# Patient Record
Sex: FEMALE | Race: WHITE | URBAN - NONMETROPOLITAN AREA
[De-identification: names, ages, dates, MRNs, and addresses within clinical notes are randomized per-mention and may not be internally consistent; named-entity substitution may affect disease eponyms.]

---

## 2024-06-04 PROCEDURE — 93306 TTE W/DOPPLER COMPLETE: CPT | Performed by: INTERNAL MEDICINE

## 2024-06-12 ENCOUNTER — TELEPHONE (OUTPATIENT)
Dept: CARDIOLOGY | Facility: CLINIC | Age: 59
End: 2024-06-12
Payer: COMMERCIAL

## 2024-06-12 NOTE — TELEPHONE ENCOUNTER
Phoned pt to schedule referral appt. Pt states there is a dr she do not want. Pt then states she can not do this  now and will call back. Pt was offered a appt with mt 6-18-24 pt declined.    Home

## 2024-07-10 ENCOUNTER — APPOINTMENT (OUTPATIENT)
Dept: CARDIOLOGY | Facility: CLINIC | Age: 59
End: 2024-07-10
Payer: COMMERCIAL

## 2024-07-10 VITALS
HEART RATE: 67 BPM | WEIGHT: 266 LBS | DIASTOLIC BLOOD PRESSURE: 84 MMHG | HEIGHT: 63 IN | BODY MASS INDEX: 47.13 KG/M2 | SYSTOLIC BLOOD PRESSURE: 148 MMHG

## 2024-07-10 DIAGNOSIS — G47.30 SLEEP APNEA, UNSPECIFIED TYPE: ICD-10-CM

## 2024-07-10 DIAGNOSIS — R06.09 DYSPNEA ON EXERTION: Primary | ICD-10-CM

## 2024-07-10 DIAGNOSIS — E11.9 DIABETES MELLITUS TYPE II, NON INSULIN DEPENDENT (MULTI): ICD-10-CM

## 2024-07-10 DIAGNOSIS — Z87.891 FORMER SMOKER: ICD-10-CM

## 2024-07-10 DIAGNOSIS — I10 ESSENTIAL HYPERTENSION: ICD-10-CM

## 2024-07-10 DIAGNOSIS — R93.1 ABNORMAL ECHOCARDIOGRAM: ICD-10-CM

## 2024-07-10 DIAGNOSIS — R53.83 FATIGUE, UNSPECIFIED TYPE: ICD-10-CM

## 2024-07-10 PROBLEM — R06.02 SHORTNESS OF BREATH: Status: ACTIVE | Noted: 2024-07-10

## 2024-07-10 PROCEDURE — 1036F TOBACCO NON-USER: CPT | Performed by: INTERNAL MEDICINE

## 2024-07-10 PROCEDURE — 93000 ELECTROCARDIOGRAM COMPLETE: CPT | Performed by: INTERNAL MEDICINE

## 2024-07-10 PROCEDURE — 3079F DIAST BP 80-89 MM HG: CPT | Performed by: INTERNAL MEDICINE

## 2024-07-10 PROCEDURE — 99205 OFFICE O/P NEW HI 60 MIN: CPT | Performed by: INTERNAL MEDICINE

## 2024-07-10 PROCEDURE — 3077F SYST BP >= 140 MM HG: CPT | Performed by: INTERNAL MEDICINE

## 2024-07-10 PROCEDURE — 4010F ACE/ARB THERAPY RXD/TAKEN: CPT | Performed by: INTERNAL MEDICINE

## 2024-07-10 RX ORDER — MULTIVITAMIN/IRON/FOLIC ACID 18MG-0.4MG
1 TABLET ORAL DAILY
COMMUNITY

## 2024-07-10 RX ORDER — REGADENOSON 0.08 MG/ML
0.4 INJECTION, SOLUTION INTRAVENOUS
OUTPATIENT
Start: 2024-07-10

## 2024-07-10 RX ORDER — GLIMEPIRIDE 1 MG/1
1 TABLET ORAL
COMMUNITY

## 2024-07-10 RX ORDER — SERTRALINE HYDROCHLORIDE 50 MG/1
50 TABLET, FILM COATED ORAL DAILY
COMMUNITY

## 2024-07-10 RX ORDER — METOPROLOL TARTRATE 50 MG/1
50 TABLET ORAL 2 TIMES DAILY
COMMUNITY

## 2024-07-10 RX ORDER — LANOLIN ALCOHOL/MO/W.PET/CERES
500 CREAM (GRAM) TOPICAL DAILY
COMMUNITY

## 2024-07-10 RX ORDER — VALSARTAN 80 MG/1
80 TABLET ORAL DAILY
Qty: 90 TABLET | Refills: 3 | Status: SHIPPED | OUTPATIENT
Start: 2024-07-10 | End: 2025-07-10

## 2024-07-10 RX ORDER — AMINOPHYLLINE 25 MG/ML
125 INJECTION, SOLUTION INTRAVENOUS ONCE AS NEEDED
OUTPATIENT
Start: 2024-07-10

## 2024-07-10 RX ORDER — METFORMIN HYDROCHLORIDE 1000 MG/1
1000 TABLET ORAL
COMMUNITY

## 2024-07-10 ASSESSMENT — ENCOUNTER SYMPTOMS: SHORTNESS OF BREATH: 1

## 2024-07-10 NOTE — PROGRESS NOTES
Cardiology Consultation- New Consult    Reason for referral: Shortness of breath fatigue    HPI: Deniec Cruz is a 58 y.o. female with no prior cardiac history over the last few months has been describing decreased exercise tolerance, fatigue, tiredness and dyspnea on exertion.  The patient had multiple risk factor for ischemic heart disease including hypertension, obesity, diabetes mellitus and tobacco use.  Patient reports she quit smoking about 9 months ago.  Patient report classic symptoms of sleep apnea.  She recently underwent echocardiogram that showed hyperdynamic LV systolic function.  Following that her metoprolol dose was increased.  Her blood pressure remains suboptimally controlled.  The patient has not had any recent ischemic evaluation.  She described functional class II and almost functional class III.    Assessment    1.  Complaint of fatigue, tiredness, shortness of breath almost class III and patient with multiple risk factor for ischemic heart disease.  Recent echocardiogram showed hyperdynamic LV systolic function.  She is moderate to high risk for ischemic heart disease  2.  Essential hypertension not well-controlled  3.  Morbid obesity  4.  Clinical picture of sleep apnea  5.  Reformed smoker  6.  Diabetes mellitus  7.  Abnormal echocardiogram showing hyperdynamic LV systolic function and LVH  8.  History of ACE induced cough    Plan    1.  I recommended a trial of valsartan 80 mg once daily to optimize blood pressure control  2.  I advised the patient that her symptoms would be likely due to her morbid obesity possible sleep apnea in addition we need to exclude underlying ischemic heart disease concerning her risk profile and symptoms  3.  I recommended proceeding with outpatient sleep evaluation, pulmonary function test and Lexiscan myocardial perfusion study  4.  Encouraged her to continue to lose weight and exercise  5.  Continue to encourage her to stay abstinent from smoking  6.   Follow-up after testing is done        Past Medical History:   Hypertension, diabetes mellitus and morbid obesity she also reformed smoker    Surgical History:   She has a past surgical history that includes  section, classic.    Family History:   Family History   Problem Relation Name Age of Onset    Other (heart problems) Mother      Kidney failure Mother      Other (stents) Mother      Hypertension Father      Heart failure Father      Diabetes type I Father      Kidney failure Father      Diabetes type I Sister      Adjustment Disorder with Mixed Anxiety and Depressed Mood Sister      Depression Sister         Social History:   Social History     Tobacco Use    Smoking status: Former     Types: Cigarettes    Smokeless tobacco: Never   Substance Use Topics    Alcohol use: Not Currently        Allergies:  Patient has no known allergies.     Current Medications:    Current Outpatient Medications:     ascorbic acid (Vitamin C) 500 mg ER capsule, Take 1 capsule (500 mg) by mouth once daily., Disp: , Rfl:     b complex 0.4 mg tablet, Take 1 tablet by mouth once daily., Disp: , Rfl:     glimepiride (Amaryl) 1 mg tablet, Take 1 tablet (1 mg) by mouth once daily in the morning. Take before meals., Disp: , Rfl:     metFORMIN (Glucophage) 1,000 mg tablet, Take 1 tablet (1,000 mg) by mouth 2 times daily (morning and late afternoon)., Disp: , Rfl:     metoprolol tartrate (Lopressor) 50 mg tablet, Take 1 tablet by mouth 2 times a day., Disp: , Rfl:     sertraline (Zoloft) 50 mg tablet, Take 1 tablet (50 mg) by mouth once daily., Disp: , Rfl:     valacyclovir HCl (VALACYCLOVIR ORAL), Take 1 mg by mouth early in the morning.., Disp: , Rfl:     vitamin D3-vitamin K2 1,250-200 mcg capsule, Take by mouth., Disp: , Rfl:     valsartan (Diovan) 80 mg tablet, Take 1 tablet (80 mg) by mouth once daily., Disp: 90 tablet, Rfl: 3     Vitals:  Vitals:    07/10/24 1326 07/10/24 1327   BP: 152/82 148/84   BP Location: Right arm Left  "arm   Patient Position: Sitting Sitting   Pulse: 67    Weight: 121 kg (266 lb)    Height: 1.6 m (5' 3\")     EKG done in office today        Review of Systems   Constitutional: Positive for malaise/fatigue.   Respiratory:  Positive for shortness of breath.    All other systems reviewed and are negative.      Objective         Physical Exam  Constitutional:       Appearance: Normal appearance.   HENT:      Nose: Nose normal.   Neck:      Vascular: No carotid bruit.   Cardiovascular:      Rate and Rhythm: Normal rate.      Pulses: Normal pulses.      Heart sounds: Normal heart sounds.   Pulmonary:      Effort: Pulmonary effort is normal.   Abdominal:      General: Bowel sounds are normal.      Palpations: Abdomen is soft.   Musculoskeletal:         General: Normal range of motion.      Cervical back: Normal range of motion.      Right lower leg: No edema.      Left lower leg: No edema.   Skin:     General: Skin is warm and dry.   Neurological:      General: No focal deficit present.      Mental Status: She is alert.   Psychiatric:         Mood and Affect: Mood normal.         Behavior: Behavior normal.         Thought Content: Thought content normal.         Judgment: Judgment normal.                Assessment and Plan:   1. Dyspnea on exertion  Follow Up In Cardiology    ECG 12 Lead    Nuclear Stress Test    regadenoson (Lexiscan) injection 0.4 mg    aminophylline syringe 125 mg 5 mL    Complete Pulmonary Function Test (Spirometry/DLCO/Lung Volumes)      2. Abnormal echocardiogram        3. Essential hypertension  valsartan (Diovan) 80 mg tablet      4. Fatigue, unspecified type  Follow Up In Cardiology      5. Diabetes mellitus type II, non insulin dependent (Multi)        6. Sleep apnea, unspecified type  Referral to Adult Sleep Medicine      7. BMI 45.0-49.9, adult (Multi)        8. Former smoker               Scribe Attestation  By signing my name below, Mirela DUFF LPN  , Scribe   attest that this documentation " has been prepared under the direction and in the presence of Carley Bergman MD.    Provider Attestation - Scribe documentation    All medical record entries made by the Scribe were at my direction and personally dictated by me. I have reviewed the chart and agree that the record accurately reflects my personal performance of the history, physical exam, discussion and plan.

## 2024-07-10 NOTE — LETTER
July 10, 2024     Edouard Mckeon MD  [address] [place] [address]    Patient: Denice Cruz   YOB: 1965   Date of Visit: 7/10/2024       Dear Dr. Edouard Mckeon MD:    Thank you for referring Denice Cruz to me for evaluation. Below are my notes for this consultation.  If you have questions, please do not hesitate to call me. I look forward to following your patient along with you.       Sincerely,     Carley Bergman MD      CC: No Recipients  ______________________________________________________________________________________    Cardiology Consultation- New Consult    Reason for referral: Shortness of breath fatigue    HPI: Denice Cruz is a 58 y.o. female with no prior cardiac history over the last few months has been describing decreased exercise tolerance, fatigue, tiredness and dyspnea on exertion.  The patient had multiple risk factor for ischemic heart disease including hypertension, obesity, diabetes mellitus and tobacco use.  Patient reports she quit smoking about 9 months ago.  Patient report classic symptoms of sleep apnea.  She recently underwent echocardiogram that showed hyperdynamic LV systolic function.  Following that her metoprolol dose was increased.  Her blood pressure remains suboptimally controlled.  The patient has not had any recent ischemic evaluation.  She described functional class II and almost functional class III.    Assessment    1.  Complaint of fatigue, tiredness, shortness of breath almost class III and patient with multiple risk factor for ischemic heart disease.  Recent echocardiogram showed hyperdynamic LV systolic function.  She is moderate to high risk for ischemic heart disease  2.  Essential hypertension not well-controlled  3.  Morbid obesity  4.  Clinical picture of sleep apnea  5.  Reformed smoker  6.  Diabetes mellitus  7.  Abnormal echocardiogram showing hyperdynamic LV systolic function and LVH  8.  History of ACE induced  cough    Plan    1.  I recommended a trial of valsartan 80 mg once daily to optimize blood pressure control  2.  I advised the patient that her symptoms would be likely due to her morbid obesity possible sleep apnea in addition we need to exclude underlying ischemic heart disease concerning her risk profile and symptoms  3.  I recommended proceeding with outpatient sleep evaluation, pulmonary function test and Lexiscan myocardial perfusion study  4.  Encouraged her to continue to lose weight and exercise  5.  Continue to encourage her to stay abstinent from smoking  6.  Follow-up after testing is done        Past Medical History:   Hypertension, diabetes mellitus and morbid obesity she also reformed smoker    Surgical History:   She has a past surgical history that includes  section, classic.    Family History:   Family History   Problem Relation Name Age of Onset   • Other (heart problems) Mother     • Kidney failure Mother     • Other (stents) Mother     • Hypertension Father     • Heart failure Father     • Diabetes type I Father     • Kidney failure Father     • Diabetes type I Sister     • Adjustment Disorder with Mixed Anxiety and Depressed Mood Sister     • Depression Sister         Social History:   Social History     Tobacco Use   • Smoking status: Former     Types: Cigarettes   • Smokeless tobacco: Never   Substance Use Topics   • Alcohol use: Not Currently        Allergies:  Patient has no known allergies.     Current Medications:    Current Outpatient Medications:   •  ascorbic acid (Vitamin C) 500 mg ER capsule, Take 1 capsule (500 mg) by mouth once daily., Disp: , Rfl:   •  b complex 0.4 mg tablet, Take 1 tablet by mouth once daily., Disp: , Rfl:   •  glimepiride (Amaryl) 1 mg tablet, Take 1 tablet (1 mg) by mouth once daily in the morning. Take before meals., Disp: , Rfl:   •  metFORMIN (Glucophage) 1,000 mg tablet, Take 1 tablet (1,000 mg) by mouth 2 times daily (morning and late afternoon).,  "Disp: , Rfl:   •  metoprolol tartrate (Lopressor) 50 mg tablet, Take 1 tablet by mouth 2 times a day., Disp: , Rfl:   •  sertraline (Zoloft) 50 mg tablet, Take 1 tablet (50 mg) by mouth once daily., Disp: , Rfl:   •  valacyclovir HCl (VALACYCLOVIR ORAL), Take 1 mg by mouth early in the morning.., Disp: , Rfl:   •  vitamin D3-vitamin K2 1,250-200 mcg capsule, Take by mouth., Disp: , Rfl:   •  valsartan (Diovan) 80 mg tablet, Take 1 tablet (80 mg) by mouth once daily., Disp: 90 tablet, Rfl: 3     Vitals:  Vitals:    07/10/24 1326 07/10/24 1327   BP: 152/82 148/84   BP Location: Right arm Left arm   Patient Position: Sitting Sitting   Pulse: 67    Weight: 121 kg (266 lb)    Height: 1.6 m (5' 3\")     EKG done in office today        Review of Systems   Constitutional: Positive for malaise/fatigue.   Respiratory:  Positive for shortness of breath.    All other systems reviewed and are negative.      Objective        Physical Exam  Constitutional:       Appearance: Normal appearance.   HENT:      Nose: Nose normal.   Neck:      Vascular: No carotid bruit.   Cardiovascular:      Rate and Rhythm: Normal rate.      Pulses: Normal pulses.      Heart sounds: Normal heart sounds.   Pulmonary:      Effort: Pulmonary effort is normal.   Abdominal:      General: Bowel sounds are normal.      Palpations: Abdomen is soft.   Musculoskeletal:         General: Normal range of motion.      Cervical back: Normal range of motion.      Right lower leg: No edema.      Left lower leg: No edema.   Skin:     General: Skin is warm and dry.   Neurological:      General: No focal deficit present.      Mental Status: She is alert.   Psychiatric:         Mood and Affect: Mood normal.         Behavior: Behavior normal.         Thought Content: Thought content normal.         Judgment: Judgment normal.                Assessment and Plan:   1. Dyspnea on exertion  Follow Up In Cardiology    ECG 12 Lead    Nuclear Stress Test    regadenoson (Lexiscan) " injection 0.4 mg    aminophylline syringe 125 mg 5 mL    Complete Pulmonary Function Test (Spirometry/DLCO/Lung Volumes)      2. Abnormal echocardiogram        3. Essential hypertension  valsartan (Diovan) 80 mg tablet      4. Fatigue, unspecified type  Follow Up In Cardiology      5. Diabetes mellitus type II, non insulin dependent (Multi)        6. Sleep apnea, unspecified type  Referral to Adult Sleep Medicine      7. BMI 45.0-49.9, adult (Multi)        8. Former smoker               Scribe Attestation  By signing my name below, I, Mirela CRAVEN LPN  , Scribe   attest that this documentation has been prepared under the direction and in the presence of Carley Bergman MD.    Provider Attestation - Scribe documentation    All medical record entries made by the Scribe were at my direction and personally dictated by me. I have reviewed the chart and agree that the record accurately reflects my personal performance of the history, physical exam, discussion and plan.

## 2024-07-10 NOTE — PATIENT INSTRUCTIONS
Please bring all medicines, vitamins, and herbal supplements with you when you come to the office.    Prescriptions will not be filled unless you are compliant with your follow up appointments or have a follow up appointment scheduled as per instruction of your physician. Refills should be requested at the time of your visit.     BMI was above normal measurement. Current weight: 121 kg (266 lb)  Weight change since last visit (-) denotes wt loss 266 lbs   Weight loss needed to achieve BMI 25: 125.2 Lbs  Weight loss needed to achieve BMI 30: 97 Lbs  Provided instructions on dietary changes.

## 2024-07-29 ENCOUNTER — HOSPITAL ENCOUNTER (OUTPATIENT)
Dept: RADIOLOGY | Facility: CLINIC | Age: 59
Discharge: HOME | End: 2024-07-29
Payer: COMMERCIAL

## 2024-07-29 ENCOUNTER — HOSPITAL ENCOUNTER (OUTPATIENT)
Dept: CARDIOLOGY | Facility: CLINIC | Age: 59
Discharge: HOME | End: 2024-07-29
Payer: COMMERCIAL

## 2024-07-29 VITALS — DIASTOLIC BLOOD PRESSURE: 84 MMHG | SYSTOLIC BLOOD PRESSURE: 134 MMHG | HEART RATE: 66 BPM

## 2024-07-29 DIAGNOSIS — R06.09 DYSPNEA ON EXERTION: ICD-10-CM

## 2024-07-29 PROCEDURE — 2500000004 HC RX 250 GENERAL PHARMACY W/ HCPCS (ALT 636 FOR OP/ED): Performed by: INTERNAL MEDICINE

## 2024-07-29 PROCEDURE — A9502 TC99M TETROFOSMIN: HCPCS | Performed by: INTERNAL MEDICINE

## 2024-07-29 PROCEDURE — 78452 HT MUSCLE IMAGE SPECT MULT: CPT

## 2024-07-29 PROCEDURE — 3430000001 HC RX 343 DIAGNOSTIC RADIOPHARMACEUTICALS: Performed by: INTERNAL MEDICINE

## 2024-07-29 PROCEDURE — 93017 CV STRESS TEST TRACING ONLY: CPT

## 2024-07-29 RX ORDER — REGADENOSON 0.08 MG/ML
0.4 INJECTION, SOLUTION INTRAVENOUS ONCE
Status: COMPLETED | OUTPATIENT
Start: 2024-07-29 | End: 2024-07-29

## 2024-07-30 ENCOUNTER — HOSPITAL ENCOUNTER (OUTPATIENT)
Dept: RADIOLOGY | Facility: CLINIC | Age: 59
Discharge: HOME | End: 2024-07-30
Payer: COMMERCIAL

## 2024-07-30 PROCEDURE — 3430000001 HC RX 343 DIAGNOSTIC RADIOPHARMACEUTICALS: Performed by: INTERNAL MEDICINE

## 2024-07-30 PROCEDURE — A9502 TC99M TETROFOSMIN: HCPCS | Performed by: INTERNAL MEDICINE

## 2024-07-31 ENCOUNTER — TELEPHONE (OUTPATIENT)
Dept: CARDIOLOGY | Facility: CLINIC | Age: 59
End: 2024-07-31
Payer: COMMERCIAL

## 2024-07-31 DIAGNOSIS — I10 ESSENTIAL HYPERTENSION: ICD-10-CM

## 2024-07-31 NOTE — TELEPHONE ENCOUNTER
----- Message from Carley Bergman sent at 7/31/2024  1:56 PM EDT -----  Advise patient stress test is normal  ----- Message -----  From: Interface, Radiology Results In  Sent: 7/31/2024   8:13 AM EDT  To: Carley Bergman MD

## 2024-08-01 RX ORDER — VALSARTAN 80 MG/1
80 TABLET ORAL DAILY
Qty: 90 TABLET | Refills: 3 | Status: SHIPPED | OUTPATIENT
Start: 2024-08-01 | End: 2025-08-01

## 2024-10-10 ENCOUNTER — APPOINTMENT (OUTPATIENT)
Dept: CARDIOLOGY | Facility: CLINIC | Age: 59
End: 2024-10-10
Payer: COMMERCIAL

## 2024-10-22 ENCOUNTER — APPOINTMENT (OUTPATIENT)
Dept: CARDIOLOGY | Facility: CLINIC | Age: 59
End: 2024-10-22
Payer: COMMERCIAL

## 2024-10-22 VITALS
WEIGHT: 264 LBS | HEART RATE: 60 BPM | BODY MASS INDEX: 46.78 KG/M2 | HEIGHT: 63 IN | DIASTOLIC BLOOD PRESSURE: 62 MMHG | SYSTOLIC BLOOD PRESSURE: 138 MMHG

## 2024-10-22 DIAGNOSIS — E11.9 DIABETES MELLITUS TYPE II, NON INSULIN DEPENDENT (MULTI): ICD-10-CM

## 2024-10-22 DIAGNOSIS — R06.09 DYSPNEA ON EXERTION: Primary | ICD-10-CM

## 2024-10-22 DIAGNOSIS — Z87.891 FORMER SMOKER: ICD-10-CM

## 2024-10-22 DIAGNOSIS — G47.30 SLEEP APNEA, UNSPECIFIED TYPE: ICD-10-CM

## 2024-10-22 DIAGNOSIS — R53.83 FATIGUE, UNSPECIFIED TYPE: ICD-10-CM

## 2024-10-22 DIAGNOSIS — I10 ESSENTIAL HYPERTENSION: ICD-10-CM

## 2024-10-22 PROCEDURE — 99214 OFFICE O/P EST MOD 30 MIN: CPT | Performed by: INTERNAL MEDICINE

## 2024-10-22 PROCEDURE — 1036F TOBACCO NON-USER: CPT | Performed by: INTERNAL MEDICINE

## 2024-10-22 PROCEDURE — 3078F DIAST BP <80 MM HG: CPT | Performed by: INTERNAL MEDICINE

## 2024-10-22 PROCEDURE — 3075F SYST BP GE 130 - 139MM HG: CPT | Performed by: INTERNAL MEDICINE

## 2024-10-22 PROCEDURE — 3008F BODY MASS INDEX DOCD: CPT | Performed by: INTERNAL MEDICINE

## 2024-10-22 PROCEDURE — 4010F ACE/ARB THERAPY RXD/TAKEN: CPT | Performed by: INTERNAL MEDICINE

## 2024-10-22 NOTE — PATIENT INSTRUCTIONS
Please bring all medicines, vitamins, and herbal supplements with you when you come to the office.    Prescriptions will not be filled unless you are compliant with your follow up appointments or have a follow up appointment scheduled as per instruction of your physician. Refills should be requested at the time of your visit.     BMI was above normal measurement. Current weight: 120 kg (264 lb)  Weight change since last visit (-) denotes wt loss -2 lbs   Weight loss needed to achieve BMI 25: 123.2 Lbs  Weight loss needed to achieve BMI 30: 95 Lbs  Provided instructions on dietary changes  Provided instructions on exercise.

## 2024-10-22 NOTE — LETTER
October 22, 2024     Edouard Mckeon MD  [address] [place] [address]    Patient: Denice Cruz   YOB: 1965   Date of Visit: 10/22/2024       Dear Dr. Edouard Mckeon MD:    Thank you for referring Denice Cruz to me for evaluation. Below are my notes for this consultation.  If you have questions, please do not hesitate to call me. I look forward to following your patient along with you.       Sincerely,     Carley Bergman MD      CC: No Recipients  ______________________________________________________________________________________    Subjective   Denice Cruz is a 59 y.o. female       Chief Complaint    Follow-up          HPI   Patient is here for follow-up continue management for recent evaluation for symptoms of fatigue, tiredness and shortness of breath.  She underwent extensive evaluation including echocardiogram which showed hyperdynamic LV systolic function, had a stress test was negative for myocardial ischemia.  Her pulmonary function test showed mild COPD.  She had been on metoprolol and I added valsartan which resulted in improvement of her blood pressure.  She reports slight improvement but remains short of breath.  I suspect most of her symptoms related to morbid obesity and age.      Assessment     1.  Complaint of fatigue, tiredness, shortness of breath almost class III and patient with multiple risk factor for ischemic heart disease.  Recent echocardiogram showed hyperdynamic LV systolic function.  Recent stress test was negative for myocardial ischemia.  Pulmonary function test showed mild COPD.  I believe contributing element is age and deconditioning in addition morbid obesity  2.  Essential hypertension control has improved since we added valsartan  3.  Morbid obesity  4.  Clinical picture of sleep apnea she did not have her sleep study as was advised  5.  Reformed smoker  6.  Diabetes mellitus  7.  Abnormal echocardiogram showing hyperdynamic LV  "systolic function and LVH  8.  History of ACE induced cough     Plan     1.  I reviewed with the patient the result of her diagnostic testing and I suggested continue present medical regimen  2.  I advised the patient to exercise and try to lose weight and consider seeing the sleep clinic in the weight loss clinic  3.  I reviewed her testing with her  4.  Encouraged her to continue to lose weight and exercise  5.  Continue to encourage her to stay abstinent from smoking  6.  I will see her in 6 months if there is no improvement down the road we may consider right and left heart cath  Review of Systems   All other systems reviewed and are negative.           Vitals:    10/22/24 1337   BP: 138/62   BP Location: Right arm   Patient Position: Sitting   Pulse: 60   Weight: 120 kg (264 lb)   Height: 1.6 m (5' 3\")        Objective   Physical Exam  Constitutional:       Appearance: Normal appearance.   HENT:      Nose: Nose normal.   Neck:      Vascular: No carotid bruit.   Cardiovascular:      Rate and Rhythm: Normal rate.      Pulses: Normal pulses.      Heart sounds: Normal heart sounds.   Pulmonary:      Effort: Pulmonary effort is normal.   Abdominal:      General: Bowel sounds are normal.      Palpations: Abdomen is soft.   Musculoskeletal:         General: Normal range of motion.      Cervical back: Normal range of motion.      Right lower leg: No edema.      Left lower leg: No edema.   Skin:     General: Skin is warm and dry.   Neurological:      General: No focal deficit present.      Mental Status: She is alert.   Psychiatric:         Mood and Affect: Mood normal.         Behavior: Behavior normal.         Thought Content: Thought content normal.         Judgment: Judgment normal.         Allergies  Patient has no known allergies.     Current Medications    Current Outpatient Medications:   •  ascorbic acid (Vitamin C) 500 mg ER capsule, Take 1 capsule (500 mg) by mouth once daily., Disp: , Rfl:   •  b complex 0.4 " mg tablet, Take 1 tablet by mouth once daily., Disp: , Rfl:   •  glimepiride (Amaryl) 1 mg tablet, Take 1 tablet (1 mg) by mouth once daily in the morning. Take before meals., Disp: , Rfl:   •  metFORMIN (Glucophage) 1,000 mg tablet, Take 1 tablet (1,000 mg) by mouth 2 times daily (morning and late afternoon)., Disp: , Rfl:   •  sertraline (Zoloft) 50 mg tablet, Take 1 tablet (50 mg) by mouth once daily., Disp: , Rfl:   •  valacyclovir HCl (VALACYCLOVIR ORAL), Take 1 mg by mouth early in the morning.., Disp: , Rfl:   •  valsartan (Diovan) 80 mg tablet, Take 1 tablet (80 mg) by mouth once daily., Disp: 90 tablet, Rfl: 3  •  vitamin D3-vitamin K2 1,250-200 mcg capsule, Take by mouth., Disp: , Rfl:                      Assessment/Plan   1. Dyspnea on exertion  Follow Up In Cardiology      2. Fatigue, unspecified type  Follow Up In Cardiology      3. Essential hypertension  Follow Up In Cardiology      4. BMI 45.0-49.9, adult (Multi)        5. Diabetes mellitus type II, non insulin dependent (Multi)        6. Sleep apnea, unspecified type        7. Former smoker                 Scribe Attestation  By signing my name below, Hedy DUFF LPN, Scribe   attest that this documentation has been prepared under the direction and in the presence of Carley Bergman MD.     Provider Attestation - Scribe documentation    All medical record entries made by the Scribe were at my direction and personally dictated by me. I have reviewed the chart and agree that the record accurately reflects my personal performance of the history, physical exam, discussion and plan.

## 2024-10-22 NOTE — PROGRESS NOTES
Subjective   Denice Cruz is a 59 y.o. female       Chief Complaint    Follow-up          HPI   Patient is here for follow-up continue management for recent evaluation for symptoms of fatigue, tiredness and shortness of breath.  She underwent extensive evaluation including echocardiogram which showed hyperdynamic LV systolic function, had a stress test was negative for myocardial ischemia.  Her pulmonary function test showed mild COPD.  She had been on metoprolol and I added valsartan which resulted in improvement of her blood pressure.  She reports slight improvement but remains short of breath.  I suspect most of her symptoms related to morbid obesity and age.      Assessment     1.  Complaint of fatigue, tiredness, shortness of breath almost class III and patient with multiple risk factor for ischemic heart disease.  Recent echocardiogram showed hyperdynamic LV systolic function.  Recent stress test was negative for myocardial ischemia.  Pulmonary function test showed mild COPD.  I believe contributing element is age and deconditioning in addition morbid obesity  2.  Essential hypertension control has improved since we added valsartan  3.  Morbid obesity  4.  Clinical picture of sleep apnea she did not have her sleep study as was advised  5.  Reformed smoker  6.  Diabetes mellitus  7.  Abnormal echocardiogram showing hyperdynamic LV systolic function and LVH  8.  History of ACE induced cough     Plan     1.  I reviewed with the patient the result of her diagnostic testing and I suggested continue present medical regimen  2.  I advised the patient to exercise and try to lose weight and consider seeing the sleep clinic in the weight loss clinic  3.  I reviewed her testing with her  4.  Encouraged her to continue to lose weight and exercise  5.  Continue to encourage her to stay abstinent from smoking  6.  I will see her in 6 months if there is no improvement down the road we may consider right and left heart  "cath  Review of Systems   All other systems reviewed and are negative.           Vitals:    10/22/24 1337   BP: 138/62   BP Location: Right arm   Patient Position: Sitting   Pulse: 60   Weight: 120 kg (264 lb)   Height: 1.6 m (5' 3\")        Objective   Physical Exam  Constitutional:       Appearance: Normal appearance.   HENT:      Nose: Nose normal.   Neck:      Vascular: No carotid bruit.   Cardiovascular:      Rate and Rhythm: Normal rate.      Pulses: Normal pulses.      Heart sounds: Normal heart sounds.   Pulmonary:      Effort: Pulmonary effort is normal.   Abdominal:      General: Bowel sounds are normal.      Palpations: Abdomen is soft.   Musculoskeletal:         General: Normal range of motion.      Cervical back: Normal range of motion.      Right lower leg: No edema.      Left lower leg: No edema.   Skin:     General: Skin is warm and dry.   Neurological:      General: No focal deficit present.      Mental Status: She is alert.   Psychiatric:         Mood and Affect: Mood normal.         Behavior: Behavior normal.         Thought Content: Thought content normal.         Judgment: Judgment normal.         Allergies  Patient has no known allergies.     Current Medications    Current Outpatient Medications:     ascorbic acid (Vitamin C) 500 mg ER capsule, Take 1 capsule (500 mg) by mouth once daily., Disp: , Rfl:     b complex 0.4 mg tablet, Take 1 tablet by mouth once daily., Disp: , Rfl:     glimepiride (Amaryl) 1 mg tablet, Take 1 tablet (1 mg) by mouth once daily in the morning. Take before meals., Disp: , Rfl:     metFORMIN (Glucophage) 1,000 mg tablet, Take 1 tablet (1,000 mg) by mouth 2 times daily (morning and late afternoon)., Disp: , Rfl:     sertraline (Zoloft) 50 mg tablet, Take 1 tablet (50 mg) by mouth once daily., Disp: , Rfl:     valacyclovir HCl (VALACYCLOVIR ORAL), Take 1 mg by mouth early in the morning.., Disp: , Rfl:     valsartan (Diovan) 80 mg tablet, Take 1 tablet (80 mg) by mouth " once daily., Disp: 90 tablet, Rfl: 3    vitamin D3-vitamin K2 1,250-200 mcg capsule, Take by mouth., Disp: , Rfl:                      Assessment/Plan   1. Dyspnea on exertion  Follow Up In Cardiology      2. Fatigue, unspecified type  Follow Up In Cardiology      3. Essential hypertension  Follow Up In Cardiology      4. BMI 45.0-49.9, adult (Multi)        5. Diabetes mellitus type II, non insulin dependent (Multi)        6. Sleep apnea, unspecified type        7. Former smoker                 Scribe Attestation  By signing my name below, IHedy LPN   , Chloe   attest that this documentation has been prepared under the direction and in the presence of Carley Bergman MD.     Provider Attestation - Scribe documentation    All medical record entries made by the Scribe were at my direction and personally dictated by me. I have reviewed the chart and agree that the record accurately reflects my personal performance of the history, physical exam, discussion and plan.

## 2025-04-22 ENCOUNTER — APPOINTMENT (OUTPATIENT)
Dept: CARDIOLOGY | Facility: CLINIC | Age: 60
End: 2025-04-22
Payer: COMMERCIAL